# Patient Record
Sex: MALE | Race: OTHER | HISPANIC OR LATINO | URBAN - METROPOLITAN AREA
[De-identification: names, ages, dates, MRNs, and addresses within clinical notes are randomized per-mention and may not be internally consistent; named-entity substitution may affect disease eponyms.]

---

## 2023-11-03 ENCOUNTER — APPOINTMENT (OUTPATIENT)
Dept: RADIOLOGY | Facility: HOSPITAL | Age: 48
End: 2023-11-03

## 2023-11-03 ENCOUNTER — HOSPITAL ENCOUNTER (EMERGENCY)
Facility: HOSPITAL | Age: 48
Discharge: HOME | End: 2023-11-03
Attending: STUDENT IN AN ORGANIZED HEALTH CARE EDUCATION/TRAINING PROGRAM

## 2023-11-03 VITALS
OXYGEN SATURATION: 97 % | TEMPERATURE: 96.8 F | WEIGHT: 259.81 LBS | HEIGHT: 69 IN | BODY MASS INDEX: 38.48 KG/M2 | RESPIRATION RATE: 20 BRPM | HEART RATE: 88 BPM | SYSTOLIC BLOOD PRESSURE: 154 MMHG | DIASTOLIC BLOOD PRESSURE: 96 MMHG

## 2023-11-03 DIAGNOSIS — G51.0 BELL'S PALSY: Primary | ICD-10-CM

## 2023-11-03 LAB
ALBUMIN SERPL BCP-MCNC: 4.4 G/DL (ref 3.4–5)
ALP SERPL-CCNC: 95 U/L (ref 33–120)
ALT SERPL W P-5'-P-CCNC: 73 U/L (ref 10–52)
ANION GAP SERPL CALC-SCNC: 12 MMOL/L (ref 10–20)
APTT PPP: 36 SECONDS (ref 27–38)
AST SERPL W P-5'-P-CCNC: 36 U/L (ref 9–39)
BASOPHILS # BLD AUTO: 0.09 X10*3/UL (ref 0–0.1)
BASOPHILS NFR BLD AUTO: 1 %
BILIRUB SERPL-MCNC: 0.7 MG/DL (ref 0–1.2)
BUN SERPL-MCNC: 12 MG/DL (ref 6–23)
CALCIUM SERPL-MCNC: 9.7 MG/DL (ref 8.6–10.3)
CARDIAC TROPONIN I PNL SERPL HS: 4 NG/L (ref 0–20)
CHLORIDE SERPL-SCNC: 104 MMOL/L (ref 98–107)
CO2 SERPL-SCNC: 26 MMOL/L (ref 21–32)
CREAT SERPL-MCNC: 0.84 MG/DL (ref 0.5–1.3)
EOSINOPHIL # BLD AUTO: 0.05 X10*3/UL (ref 0–0.7)
EOSINOPHIL NFR BLD AUTO: 0.6 %
ERYTHROCYTE [DISTWIDTH] IN BLOOD BY AUTOMATED COUNT: 12.3 % (ref 11.5–14.5)
GFR SERPL CREATININE-BSD FRML MDRD: >90 ML/MIN/1.73M*2
GLUCOSE BLD MANUAL STRIP-MCNC: 218 MG/DL (ref 74–99)
GLUCOSE BLD MANUAL STRIP-MCNC: 284 MG/DL (ref 74–99)
GLUCOSE SERPL-MCNC: 282 MG/DL (ref 74–99)
HCT VFR BLD AUTO: 50.8 % (ref 41–52)
HGB BLD-MCNC: 17.8 G/DL (ref 13.5–17.5)
IMM GRANULOCYTES # BLD AUTO: 0.03 X10*3/UL (ref 0–0.7)
IMM GRANULOCYTES NFR BLD AUTO: 0.3 % (ref 0–0.9)
INR PPP: 1.1 (ref 0.9–1.1)
LYMPHOCYTES # BLD AUTO: 3.21 X10*3/UL (ref 1.2–4.8)
LYMPHOCYTES NFR BLD AUTO: 36.7 %
MCH RBC QN AUTO: 30.2 PG (ref 26–34)
MCHC RBC AUTO-ENTMCNC: 35 G/DL (ref 32–36)
MCV RBC AUTO: 86 FL (ref 80–100)
MONOCYTES # BLD AUTO: 0.61 X10*3/UL (ref 0.1–1)
MONOCYTES NFR BLD AUTO: 7 %
NEUTROPHILS # BLD AUTO: 4.76 X10*3/UL (ref 1.2–7.7)
NEUTROPHILS NFR BLD AUTO: 54.4 %
NRBC BLD-RTO: 0 /100 WBCS (ref 0–0)
PLATELET # BLD AUTO: 210 X10*3/UL (ref 150–450)
POTASSIUM SERPL-SCNC: 3.8 MMOL/L (ref 3.5–5.3)
PROT SERPL-MCNC: 7.8 G/DL (ref 6.4–8.2)
PROTHROMBIN TIME: 12.1 SECONDS (ref 9.8–12.8)
RBC # BLD AUTO: 5.9 X10*6/UL (ref 4.5–5.9)
SODIUM SERPL-SCNC: 138 MMOL/L (ref 136–145)
WBC # BLD AUTO: 8.8 X10*3/UL (ref 4.4–11.3)

## 2023-11-03 PROCEDURE — 70450 CT HEAD/BRAIN W/O DYE: CPT

## 2023-11-03 PROCEDURE — 85610 PROTHROMBIN TIME: CPT | Performed by: STUDENT IN AN ORGANIZED HEALTH CARE EDUCATION/TRAINING PROGRAM

## 2023-11-03 PROCEDURE — 96360 HYDRATION IV INFUSION INIT: CPT

## 2023-11-03 PROCEDURE — 36415 COLL VENOUS BLD VENIPUNCTURE: CPT | Performed by: STUDENT IN AN ORGANIZED HEALTH CARE EDUCATION/TRAINING PROGRAM

## 2023-11-03 PROCEDURE — 70450 CT HEAD/BRAIN W/O DYE: CPT | Performed by: RADIOLOGY

## 2023-11-03 PROCEDURE — 99284 EMERGENCY DEPT VISIT MOD MDM: CPT | Mod: 25 | Performed by: STUDENT IN AN ORGANIZED HEALTH CARE EDUCATION/TRAINING PROGRAM

## 2023-11-03 PROCEDURE — 85025 COMPLETE CBC W/AUTO DIFF WBC: CPT | Performed by: STUDENT IN AN ORGANIZED HEALTH CARE EDUCATION/TRAINING PROGRAM

## 2023-11-03 PROCEDURE — 2500000004 HC RX 250 GENERAL PHARMACY W/ HCPCS (ALT 636 FOR OP/ED): Performed by: STUDENT IN AN ORGANIZED HEALTH CARE EDUCATION/TRAINING PROGRAM

## 2023-11-03 PROCEDURE — 84484 ASSAY OF TROPONIN QUANT: CPT | Performed by: STUDENT IN AN ORGANIZED HEALTH CARE EDUCATION/TRAINING PROGRAM

## 2023-11-03 PROCEDURE — 80053 COMPREHEN METABOLIC PANEL: CPT | Performed by: STUDENT IN AN ORGANIZED HEALTH CARE EDUCATION/TRAINING PROGRAM

## 2023-11-03 PROCEDURE — 82947 ASSAY GLUCOSE BLOOD QUANT: CPT

## 2023-11-03 PROCEDURE — 85730 THROMBOPLASTIN TIME PARTIAL: CPT | Performed by: STUDENT IN AN ORGANIZED HEALTH CARE EDUCATION/TRAINING PROGRAM

## 2023-11-03 PROCEDURE — 99285 EMERGENCY DEPT VISIT HI MDM: CPT | Performed by: STUDENT IN AN ORGANIZED HEALTH CARE EDUCATION/TRAINING PROGRAM

## 2023-11-03 PROCEDURE — 93010 ELECTROCARDIOGRAM REPORT: CPT | Performed by: STUDENT IN AN ORGANIZED HEALTH CARE EDUCATION/TRAINING PROGRAM

## 2023-11-03 RX ORDER — METHYLPREDNISOLONE 4 MG/1
TABLET ORAL
Qty: 21 TABLET | Refills: 0 | Status: SHIPPED | OUTPATIENT
Start: 2023-11-03 | End: 2023-11-10

## 2023-11-03 RX ADMIN — SODIUM CHLORIDE 1000 ML: 9 INJECTION, SOLUTION INTRAVENOUS at 14:00

## 2023-11-03 ASSESSMENT — COLUMBIA-SUICIDE SEVERITY RATING SCALE - C-SSRS
2. HAVE YOU ACTUALLY HAD ANY THOUGHTS OF KILLING YOURSELF?: NO
6. HAVE YOU EVER DONE ANYTHING, STARTED TO DO ANYTHING, OR PREPARED TO DO ANYTHING TO END YOUR LIFE?: NO
1. IN THE PAST MONTH, HAVE YOU WISHED YOU WERE DEAD OR WISHED YOU COULD GO TO SLEEP AND NOT WAKE UP?: NO

## 2023-11-03 ASSESSMENT — LIFESTYLE VARIABLES
REASON UNABLE TO ASSESS: NO
EVER HAD A DRINK FIRST THING IN THE MORNING TO STEADY YOUR NERVES TO GET RID OF A HANGOVER: NO
HAVE PEOPLE ANNOYED YOU BY CRITICIZING YOUR DRINKING: NO
EVER FELT BAD OR GUILTY ABOUT YOUR DRINKING: NO
HAVE YOU EVER FELT YOU SHOULD CUT DOWN ON YOUR DRINKING: NO

## 2023-11-03 ASSESSMENT — PAIN SCALES - GENERAL: PAINLEVEL_OUTOF10: 0 - NO PAIN

## 2023-11-03 ASSESSMENT — PAIN - FUNCTIONAL ASSESSMENT: PAIN_FUNCTIONAL_ASSESSMENT: 0-10

## 2023-11-03 NOTE — DISCHARGE INSTRUCTIONS
Please follow-up with the provided neurologist.  Return to the emergency department if you develop any new, concerning, worsening symptoms.

## 2023-11-03 NOTE — ED PROVIDER NOTES
HPI   Chief Complaint   Patient presents with    Facial Droop     Left side facial droop started last night 10pm       This is a 47-year-old male with past medical history of diabetes presenting to ED for left-sided facial droop ongoing since 10 to 11 PM last night, he noticed these symptoms when he felt like he could not move his face, and that it felt stiff.  There has not been any associated headache or recent head trauma and has not had any other symptoms like this previously.  He denies any recent sick symptoms such as fever/chills or cough.  Denies pain anywhere in his body.                              Suni Coma Scale Score: 15         NIH Stroke Scale: 2          Patient History   History reviewed. No pertinent past medical history.  History reviewed. No pertinent surgical history.  No family history on file.  Social History     Tobacco Use    Smoking status: Never    Smokeless tobacco: Never   Vaping Use    Vaping Use: Not on file   Substance Use Topics    Alcohol use: Never    Drug use: Never       Physical Exam   ED Triage Vitals [11/03/23 1132]   Temp Heart Rate Resp BP   36 °C (96.8 °F) 104 18 (!) 199/115      SpO2 Temp Source Heart Rate Source Patient Position   98 % Temporal Monitor Sitting      BP Location FiO2 (%)     Right arm --       Physical Exam  Constitutional:       Appearance: Normal appearance.   HENT:      Head: Normocephalic and atraumatic.      Mouth/Throat:      Mouth: Mucous membranes are moist.   Eyes:      Extraocular Movements: Extraocular movements intact.   Cardiovascular:      Rate and Rhythm: Normal rate and regular rhythm.      Heart sounds: Normal heart sounds. No murmur heard.  Pulmonary:      Effort: Pulmonary effort is normal. No respiratory distress.      Breath sounds: Normal breath sounds. No wheezing.   Abdominal:      General: There is no distension.      Palpations: Abdomen is soft.      Tenderness: There is no abdominal tenderness. There is no guarding.    Musculoskeletal:      Right lower leg: No edema.      Left lower leg: No edema.   Skin:     General: Skin is warm and dry.   Neurological:      General: No focal deficit present.      Mental Status: He is alert and oriented to person, place, and time.   Psychiatric:         Mood and Affect: Mood normal.         Behavior: Behavior normal.         ED Course & [facility]   ED Course as of 23   1202 CT head without IV contrast negative for hemorrhage. Paging Dr Inman Neurology []   1207 Spoke with Dr. Inman, recommending Medrol Dosepak and Valtrex and outpatient neuro follow-up []      ED Course User Index  [] Negro Doss DO         Diagnoses as of 23   Bell's palsy       Medical Decision Making  History/Exam limitations: none.   Additional history was obtained from patient.      Last Known Well Time: 2023 at 2200        ------------------------------------------------------------------------------------------------------------------------------------------           Interval: Baseline  Time: 11:56 AM  Person Administering Scale: Negro Doss DO     1a  Level of consciousness: 0=alert; keenly responsive  1b. LOC questions:  0=Performs both tasks correctly  1c. LOC commands: 0=Performs both tasks correctly  2.  Best Gaze: 0=normal  3. Visual: 0=No visual loss  4. Facial Palsy: 3=Complete paralysis of one or both sides (absence of facial movement in the upper and lower face)  5a. Motor left arm: 0=No drift, limb holds 90 (or 45) degrees for full 10 seconds  5b.  Motor right arm: 0=No drift, limb holds 90 (or 45) degrees for full 10 seconds  6a. motor left le=No drift, limb holds 90 (or 45) degrees for full 10 seconds  6b  Motor right le=No drift, limb holds 90 (or 45) degrees for full 10 seconds  7. Limb Ataxia: 0=Absent  8.  Sensory: 0=Normal; no sensory loss  9. Best Language:  0=No aphasia, normal  10. Dysarthria: 0=Normal  11. Extinction and  Inattention: 0=No abnormality  12. Distal motor function: 0=Normal    Total:   3        VAN: VAN: Negative     ------------------------------------------------------------------------------------------------------------------------------------------     Medical Decision Making: Brain attack called, patient taken directly to CT scanner.  He is of TNK window due to symptom onset greater than 4.5 hours.     I did speak with the on-call neurologist Dr. Inman, who did recommend that the patient is appropriate for discharge with outpatient neurology follow-up, starting the patient on Medrol Dosepak.  His laboratory studies are only notable for some hyperglycemia without evidence of DKA, which improved after IV hydration.  He remains with facial droop.  He will be discharged home with outpatient neurology follow-up and given strict return precautions    Discussed with the attending  Negro Doss DO PGY-4  Emergency Medicine     EKG interpreted by myself: Normal sinus rhythm, ventricular rate 93, normal axis, QTc 432 ms, no acute injury pattern noted.     Independent Interpretation of Studies: I independently interpreted the CT head and see No obvious evidence of intracranial hemorrhage    Chronic Medical Conditions Significantly Affecting Care: None    Social Determinants of Health Significantly Affecting Care: None     External Records Reviewed: I reviewed recent and relevant outside records including: Previous charts     Discussion of Management with Other Providers:   I discussed the patient/results with: Dr Inman neurology     IV Thrombolysis:    No Thrombolysis contraindication reason: Time from Last Known Well (or stroke onset) is >4.5 hours                    Procedure  Procedures     Negro Doss DO  Resident  11/03/23 6694

## 2023-11-07 ENCOUNTER — HOSPITAL ENCOUNTER (OUTPATIENT)
Dept: CARDIOLOGY | Facility: HOSPITAL | Age: 48
Discharge: HOME | End: 2023-11-07

## 2023-11-07 LAB
ATRIAL RATE: 93 BPM
P AXIS: 40 DEGREES
P OFFSET: 219 MS
P ONSET: 161 MS
PR INTERVAL: 134 MS
Q ONSET: 228 MS
QRS COUNT: 16 BEATS
QRS DURATION: 74 MS
QT INTERVAL: 348 MS
QTC CALCULATION(BAZETT): 432 MS
QTC FREDERICIA: 403 MS
R AXIS: -7 DEGREES
T AXIS: 20 DEGREES
T OFFSET: 402 MS
VENTRICULAR RATE: 93 BPM

## 2023-11-07 PROCEDURE — 93005 ELECTROCARDIOGRAM TRACING: CPT
